# Patient Record
Sex: MALE | Race: BLACK OR AFRICAN AMERICAN | Employment: FULL TIME | URBAN - METROPOLITAN AREA
[De-identification: names, ages, dates, MRNs, and addresses within clinical notes are randomized per-mention and may not be internally consistent; named-entity substitution may affect disease eponyms.]

---

## 2020-01-19 ENCOUNTER — HOSPITAL ENCOUNTER (EMERGENCY)
Age: 25
Discharge: HOME | End: 2020-01-19
Attending: EMERGENCY MEDICINE

## 2020-01-19 ENCOUNTER — HOSPITAL ENCOUNTER (OUTPATIENT)
Dept: GENERAL RADIOLOGY | Age: 25
Discharge: HOME | End: 2020-01-19

## 2020-01-19 VITALS
WEIGHT: 140 LBS | SYSTOLIC BLOOD PRESSURE: 131 MMHG | HEART RATE: 74 BPM | BODY MASS INDEX: 21.22 KG/M2 | TEMPERATURE: 97.3 F | DIASTOLIC BLOOD PRESSURE: 87 MMHG | RESPIRATION RATE: 16 BRPM | OXYGEN SATURATION: 97 % | HEIGHT: 68 IN

## 2020-01-19 PROCEDURE — 71046 X-RAY EXAM CHEST 2 VIEWS: CPT

## 2020-01-19 PROCEDURE — 99283 EMERGENCY DEPT VISIT LOW MDM: CPT

## 2020-01-19 PROCEDURE — 73130 X-RAY EXAM OF HAND: CPT

## 2020-01-19 RX ORDER — KETOROLAC TROMETHAMINE 30 MG/ML
INJECTION, SOLUTION INTRAMUSCULAR; INTRAVENOUS
Status: DISCONTINUED
Start: 2020-01-19 | End: 2020-01-19 | Stop reason: HOSPADM
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
## 2022-09-14 ENCOUNTER — HOSPITAL ENCOUNTER (EMERGENCY)
Age: 27
Discharge: HOME | End: 2022-09-15
Payer: COMMERCIAL

## 2022-09-14 VITALS
DIASTOLIC BLOOD PRESSURE: 81 MMHG | TEMPERATURE: 98 F | HEART RATE: 64 BPM | OXYGEN SATURATION: 98 % | SYSTOLIC BLOOD PRESSURE: 118 MMHG | RESPIRATION RATE: 14 BRPM

## 2022-09-14 DIAGNOSIS — Z20.2 STD EXPOSURE: Primary | ICD-10-CM

## 2022-09-14 DIAGNOSIS — N64.59 ABNORMAL BREAST TISSUE: ICD-10-CM

## 2022-09-14 PROCEDURE — 99284 EMERGENCY DEPT VISIT MOD MDM: CPT

## 2022-09-15 PROCEDURE — 6360000002 HC RX W HCPCS: Performed by: PHYSICIAN ASSISTANT

## 2022-09-15 PROCEDURE — 6370000000 HC RX 637 (ALT 250 FOR IP): Performed by: PHYSICIAN ASSISTANT

## 2022-09-15 PROCEDURE — 96372 THER/PROPH/DIAG INJ SC/IM: CPT

## 2022-09-15 RX ORDER — CEFTRIAXONE 1 G/1
500 INJECTION, POWDER, FOR SOLUTION INTRAMUSCULAR; INTRAVENOUS ONCE
Status: COMPLETED | OUTPATIENT
Start: 2022-09-15 | End: 2022-09-15

## 2022-09-15 RX ORDER — METRONIDAZOLE 250 MG/1
500 TABLET ORAL ONCE
Status: COMPLETED | OUTPATIENT
Start: 2022-09-15 | End: 2022-09-15

## 2022-09-15 RX ORDER — DOXYCYCLINE HYCLATE 100 MG
100 TABLET ORAL ONCE
Status: COMPLETED | OUTPATIENT
Start: 2022-09-15 | End: 2022-09-15

## 2022-09-15 RX ORDER — DOXYCYCLINE HYCLATE 100 MG
100 TABLET ORAL 2 TIMES DAILY
Qty: 28 TABLET | Refills: 0 | Status: SHIPPED | OUTPATIENT
Start: 2022-09-15 | End: 2022-09-29

## 2022-09-15 RX ORDER — METRONIDAZOLE 500 MG/1
500 TABLET ORAL 2 TIMES DAILY
Qty: 28 TABLET | Refills: 0 | Status: SHIPPED | OUTPATIENT
Start: 2022-09-15 | End: 2022-09-29

## 2022-09-15 RX ADMIN — DOXYCYCLINE HYCLATE 100 MG: 100 TABLET, COATED ORAL at 00:47

## 2022-09-15 RX ADMIN — CEFTRIAXONE SODIUM 500 MG: 1 INJECTION, POWDER, FOR SOLUTION INTRAMUSCULAR; INTRAVENOUS at 00:47

## 2022-09-15 RX ADMIN — METRONIDAZOLE 500 MG: 250 TABLET ORAL at 00:47

## 2022-09-15 NOTE — ED PROVIDER NOTES
[facility] Emergency Department    CHIEF COMPLAINT  Other (Girlfriend has pelvic inflammatory disease and he is worried he has it. Also mentioned swelling in right nipple)      SHARED SERVICE VISIT:  Evaluated by MAIK. My supervising physician was available for consultation. HISTORY OF PRESENT ILLNESS  Aiden Tanner is a 27 y.o. male who presents to the ED complaining of exposure to STD. Presenting to the emergency department today requesting treatment for exposure to STD. The patient states his girlfriend that was recently treated for PID, however still sexually active with her therefore presents to the emergency department requesting treatment for STD exposure. He denies any abdominal pain, nausea or vomiting. No recent fevers or chills. He denies any dysuria or hematuria. No penile discharge. He is complaining of swelling around his right nipple. He states this has been ongoing for several months. No erythema or nipple discharge. No other complaints, modifying factors or associated symptoms. Nursing notes reviewed. History reviewed. No pertinent past medical history. History reviewed. No pertinent surgical history. History reviewed. No pertinent family history.   Social History     Socioeconomic History    Marital status: Single     Spouse name: Not on file    Number of children: Not on file    Years of education: Not on file    Highest education level: Not on file   Occupational History    Not on file   Tobacco Use    Smoking status: Every Day    Smokeless tobacco: Never   Substance and Sexual Activity    Alcohol use: Yes    Drug use: No    Sexual activity: Not on file   Other Topics Concern    Not on file   Social History Narrative    Not on file     Social Determinants of Health     Financial Resource Strain: Not on file   Food Insecurity: Not on file   Transportation Needs: Not on file   Physical Activity: Not on file   Stress: Not on file   Social Connections: Not on file   Intimate Partner Violence: Not on file   Housing Stability: Not on file     Current Facility-Administered Medications   Medication Dose Route Frequency Provider Last Rate Last Admin    cefTRIAXone (ROCEPHIN) injection 500 mg  500 mg IntraMUSCular Once Blanca Balls, PA        doxycycline hyclate (VIBRA-TABS) tablet 100 mg  100 mg Oral Once Blanca Balls, PA        metroNIDAZOLE (FLAGYL) tablet 500 mg  500 mg Oral Once Blacna Balls, PA         Current Outpatient Medications   Medication Sig Dispense Refill    doxycycline hyclate (VIBRA-TABS) 100 MG tablet Take 1 tablet by mouth 2 times daily for 14 days 28 tablet 0    metroNIDAZOLE (FLAGYL) 500 MG tablet Take 1 tablet by mouth 2 times daily for 14 days 28 tablet 0    lidocaine viscous (XYLOCAINE) 2 % solution Take 15 mLs by mouth every 3 hours as needed for Irritation or Dental Pain 1 Bottle 0    ibuprofen (ADVIL;MOTRIN) 800 MG tablet Take 1 tablet by mouth every 8 hours as needed for Pain 30 tablet 0     No Known Allergies    REVIEW OF SYSTEMS  6 systems reviewed, pertinent positives per HPI otherwise noted to be negative    PHYSICAL EXAM  /81   Pulse 64   Temp 98 °F (36.7 °C) (Oral)   Resp 14   SpO2 98%   GENERAL APPEARANCE: Awake and alert. Cooperative. No acute distress. HEAD: Normocephalic. Atraumatic. EYES: PERRL. EOM's grossly intact. ENT: Mucous membranes are moist.   NECK: Supple. Normal ROM. CHEST: Equal symmetric chest rise. LUNGS: Breathing is unlabored. Speaking comfortably in full sentences. Abdomen: Nondistended. Soft, non tender, no rebound or rigidity  EXTREMITIES: MAEE. No acute deformities. SKIN: Warm and dry. near right nipple, no surrounding erythema. No pain upon palpation. No nipple discharge. NEUROLOGICAL: Alert and oriented. Strength is 5/5 in all extremities and sensation is intact. RADIOLOGY  No results found.       ED COURSE  Patient was evaluated in the emergency department today for concerns of STD exposure. He is requesting treatment. He is also evaluated for soft tissue swelling in right breast.  The patient was given Rocephin, doxycycline and Flagyl. He is advised to abstain from sexual activity until he finishes antibiotics. I did initially order urinalysis, however the patient is unable to provide sample and requesting treatment regardless of results. Risk management discussed and shared decision making had with patient and/or surrogate. He is advised to discuss with PCP the enlarged breast tissue. All questions were answered. Patient will follow up with primary care physician in the next week for further evaluation/treatment. Patient will return to ED for new/worsening symptoms. MDM    I estimate there is LOW risk for ACUTE APPENDICITIS, BOWEL OBSTRUCTION, CHOLECYSTITIS, DIVERTICULITIS, INCARCERATED HERNIA, PANCREATITIS, or PERFORATED BOWEL or ULCER, thus I consider the discharge disposition reasonable. Also, there is no evidence or peritonitis, sepsis, or toxicity. Adelina Mello and I have discussed the diagnosis and risks, and we agree with discharging home to follow-up with their primary doctor. We also discussed returning to the Emergency Department immediately if new or worsening symptoms occur. We have discussed the symptoms which are most concerning (e.g., bloody stool, fever, changing or worsening pain, vomiting) that necessitate immediate return. FINAL Impression    1. STD exposure    2. Abnormal breast tissue        Blood pressure 118/81, pulse 64, temperature 98 °F (36.7 °C), temperature source Oral, resp. rate 14, SpO2 98 %. DISPOSITION  Patient was discharged to home in good condition.            Etta Baumgarten, [address]  09/19/22 2556

## 2022-09-15 NOTE — DISCHARGE INSTRUCTIONS
You were treated prophylactically for exposure to sexually transmitted disease. Please complete antibiotics. Any sexual partners will need to be treated. Follow-up with primary care physician for further evaluation and treatment of nipple/breast tissue swelling.

## 2023-08-30 ENCOUNTER — HOSPITAL ENCOUNTER (EMERGENCY)
Age: 28
Discharge: HOME | End: 2023-08-30
Payer: COMMERCIAL

## 2023-08-30 VITALS
TEMPERATURE: 98.7 F | HEIGHT: 68 IN | HEART RATE: 66 BPM | DIASTOLIC BLOOD PRESSURE: 94 MMHG | BODY MASS INDEX: 23.49 KG/M2 | OXYGEN SATURATION: 99 % | SYSTOLIC BLOOD PRESSURE: 132 MMHG | RESPIRATION RATE: 20 BRPM | WEIGHT: 155 LBS

## 2023-08-30 DIAGNOSIS — Z20.2 STD EXPOSURE: Primary | ICD-10-CM

## 2023-08-30 LAB
BACTERIA URNS QL MICRO: ABNORMAL /HPF
BILIRUB UR QL STRIP.AUTO: NEGATIVE
CLARITY UR: CLEAR
COLOR UR: YELLOW
EPI CELLS #/AREA URNS HPF: ABNORMAL /HPF (ref 0–5)
GLUCOSE UR STRIP.AUTO-MCNC: NEGATIVE MG/DL
HGB UR QL STRIP.AUTO: NEGATIVE
KETONES UR STRIP.AUTO-MCNC: NEGATIVE MG/DL
LEUKOCYTE ESTERASE UR QL STRIP.AUTO: ABNORMAL
NITRITE UR QL STRIP.AUTO: NEGATIVE
PH UR STRIP.AUTO: 7 [PH] (ref 5–8)
PROT UR STRIP.AUTO-MCNC: NEGATIVE MG/DL
RBC #/AREA URNS HPF: ABNORMAL /HPF (ref 0–4)
SP GR UR STRIP.AUTO: 1.01 (ref 1–1.03)
UA COMPLETE W REFLEX CULTURE PNL UR: YES
UA DIPSTICK W REFLEX MICRO PNL UR: YES
URN SPEC COLLECT METH UR: ABNORMAL
UROBILINOGEN UR STRIP-ACNC: 0.2 E.U./DL
WBC #/AREA URNS HPF: ABNORMAL /HPF (ref 0–5)

## 2023-08-30 PROCEDURE — 99283 EMERGENCY DEPT VISIT LOW MDM: CPT

## 2023-08-30 PROCEDURE — 6370000000 HC RX 637 (ALT 250 FOR IP): Performed by: NURSE PRACTITIONER

## 2023-08-30 PROCEDURE — 87591 N.GONORRHOEAE DNA AMP PROB: CPT

## 2023-08-30 PROCEDURE — 87086 URINE CULTURE/COLONY COUNT: CPT

## 2023-08-30 PROCEDURE — 87491 CHLMYD TRACH DNA AMP PROBE: CPT

## 2023-08-30 PROCEDURE — 81001 URINALYSIS AUTO W/SCOPE: CPT

## 2023-08-30 RX ORDER — DOXYCYCLINE HYCLATE 100 MG
100 TABLET ORAL ONCE
Status: COMPLETED | OUTPATIENT
Start: 2023-08-30 | End: 2023-08-30

## 2023-08-30 RX ORDER — DOXYCYCLINE HYCLATE 100 MG
100 TABLET ORAL 2 TIMES DAILY
Qty: 14 TABLET | Refills: 0 | Status: SHIPPED | OUTPATIENT
Start: 2023-08-30 | End: 2023-09-06

## 2023-08-30 RX ADMIN — DOXYCYCLINE HYCLATE 100 MG: 100 TABLET, COATED ORAL at 20:28

## 2023-08-30 ASSESSMENT — PAIN - FUNCTIONAL ASSESSMENT: PAIN_FUNCTIONAL_ASSESSMENT: NONE - DENIES PAIN

## 2023-08-31 NOTE — ED PROVIDER NOTES
[address] [place] [address]  ED  EMERGENCY DEPARTMENT ENCOUNTER        Pt Name: Tyler Ferrara  MRN: [id number]  [address] [facility] [place] 1995  Date of evaluation: 8/30/2023  Provider: KENY Maynard CNP  PCP: No primary care provider on file. MAIK. I have evaluated this patient. CHIEF COMPLAINT       Chief Complaint   Patient presents with    Exposure to STD     Patient to the ED for STD treatment. States he wants treated for chlamydia. States he was tested 2 months ago and was treated but didn't take the meds because they made him sick. HISTORY OF PRESENT ILLNESS: 1 or more Elements     History From: the patient  Limitations to history : None    Tyler Ferrara is a 32 y.o. male who presents to the emergency room today with complaints of being exposed to chlamydia. Patient states that he had chlamydia in the past he never really got treated for it, he was here a year ago for that, he states that 2 months ago he also tested positive but did not take those meds either. Patient girlfriend is here with him states that she just tested positive for chlamydia, patient denied any symptoms but wants to be treated. He is here for further evaluation. Nursing Notes were all reviewed and agreed with or any disagreements were addressed in the HPI. REVIEW OF SYSTEMS :      Review of Systems    Positives and Pertinent negatives as per HPI. SURGICAL HISTORY   History reviewed. No pertinent surgical history.  [facility]       Discharge Medication List as of 8/30/2023  8:25 PM        CONTINUE these medications which have NOT CHANGED    Details   lidocaine viscous (XYLOCAINE) 2 % solution Take 15 mLs by mouth every 3 hours as needed for Irritation or Dental Pain, Disp-1 Bottle, R-0Print      ibuprofen (ADVIL;MOTRIN) 800 MG tablet Take 1 tablet by mouth every 8 hours as needed for Pain, Disp-30 tablet, R-0Print             ALLERGIES     Patient has no known allergies.     FAMILYHISTORY

## 2023-08-31 NOTE — ED NOTES
Pt scripts x1 instructed to follow up with PCP. Assessed per Juliane Pennington NP.      Geovanna Strickland LPN  86/92/96 [id number]

## 2023-09-01 LAB
BACTERIA UR CULT: NORMAL
C TRACH DNA UR QL NAA+PROBE: POSITIVE
N GONORRHOEA DNA UR QL NAA+PROBE: NEGATIVE